# Patient Record
Sex: FEMALE | Race: WHITE | Employment: FULL TIME | URBAN - METROPOLITAN AREA
[De-identification: names, ages, dates, MRNs, and addresses within clinical notes are randomized per-mention and may not be internally consistent; named-entity substitution may affect disease eponyms.]

---

## 2020-11-11 ENCOUNTER — HOSPITAL ENCOUNTER (OUTPATIENT)
Dept: PHYSICAL THERAPY | Age: 55
Discharge: HOME | End: 2020-11-11
Payer: COMMERCIAL

## 2020-11-11 PROCEDURE — 97162 PT EVAL MOD COMPLEX 30 MIN: CPT

## 2020-11-11 PROCEDURE — 97110 THERAPEUTIC EXERCISES: CPT

## 2020-11-11 PROCEDURE — 97140 MANUAL THERAPY 1/> REGIONS: CPT

## 2020-11-11 NOTE — PROGRESS NOTES
PHYSICAL THERAPY - DAILY TREATMENT NOTE    Patient Name: Lupe Lott        Date: 2020  : 1965   YES Patient  Verified  Visit #:     Insurance: Payor: Rhina Hernandes / Plan: [employer] [place] HMO / Product Type: HMO /      In time: 10:25 AM Out time: 11:25 AM   Total Treatment Time: 60     Medicare/BCBS Time Tracking (below)   Total Timed Codes (min):  na 1:1 Treatment Time:  na     TREATMENT AREA =  Cervical radiculopathy (M54.12)    SUBJECTIVE    Pain Level (on 0 to 10 scale):  2  / 10   Medication Changes/New allergies or changes in medical history, any new surgeries or procedures? NO    If yes, update Summary List   Subjective Functional Status/Changes:  []  No changes reported     See POC          OBJECTIVE    10 min Therapeutic Exercise:  [x]  See flow sheet   Rationale:      increase ROM and increase strength to improve the patients ability to perform cervical ROM with decreased pain and radicular symptoms    15 min Manual Therapy: STM cervical paraspinal mm, parascapular mm, L deep cervical flexors, L SCM; light scar massage; SOR with cervical traction    Rationale:      decrease pain, increase ROM, increase tissue extensibility and decrease trigger points to improve patient's ability to perform cervical ROM and overhead reaching with decreased pain  The manual therapy interventions were performed at a separate and distinct time from the therapeutic activities interventions.     NB min Self Care: Reviewed diagnosis, prognosis, therapy progression   Rationale:    Improve understanding of injury and therapy to have realistic expectation of therapy to improve compliance/adherence and satisfaction    Billed With/As:   [x] TE   [] TA   [] Neuro   [] Self Care Patient Education: [x] Review HEP    [] Progressed/Changed HEP based on:   [] positioning   [] body mechanics   [] transfers   [] heat/ice application    [x] other: Instructed patient in light scar massage       Other Objective/Functional Measures:    Shown and performed HEP, patient reporting radicular symptoms with full chin tuck in supine position. Therapist instructing patient to perform chin tuck in smaller range, patient reporting relief of symptoms. She did c/o of L hip/LE symptoms/tingling while performing chin tucks, therapist feeling for compensation or arching through low back and unable to determine cause of symptoms into L LE during chin tuck. Demonstrates significant tightness through parascapular and paraspinal mm, demonstrates elevated R SC joint. Post Treatment Pain Level (on 0 to 10) scale:   1.5 / 10     ASSESSMENT  Assessment/Changes in Function:     See POC     []  See Progress Note/Recertification   Patient will continue to benefit from skilled PT services to modify and progress therapeutic interventions, address functional mobility deficits, address ROM deficits, address strength deficits, analyze and address soft tissue restrictions, analyze and cue movement patterns and analyze and modify body mechanics/ergonomics to attain goals per POC.    Progress toward goals / Updated goals:    See POC     PLAN  [x]  Upgrade activities as tolerated YES Continue plan of care   []  Discharge due to :    [x]  Other: 2x week for 6 weeks      Therapist: Monica Leo PT, DPT    Date: 11/11/2020 Time: 10:24 AM

## 2020-11-11 NOTE — PROGRESS NOTES
[address], [facility], [address] [facility] - Phone: [phone]  Fax: [phone] / [phone]  Patient Name: Yohan Hassan : 1965   Medical   Diagnosis: Status post cervical spinal fusion (Z98.1) Treatment Diagnosis: Cervical radiculopathy (M54.12)   Onset Date: About 1 year prior; surgery 20     Referral Source: Edna Mora MD [facility]): 2020   Prior Hospitalization: See medical history Provider #: [id number]   Prior Level of Function: Able to work 5 hours without difficulty; Able to lift > 25lbs without difficulty or pain    Comorbidities: None   Medications: Verified on Patient Summary List   The Plan of Care and following information is based on the information from the initial evaluation.   ===========================================================================================  Assessment / key information:  Yohan Hassan is a RHD 48 y.o.  yo female who has been referred to OPPT s/p cervical spinal fusion 20. Patient presents with neck pain & stiffness with tingling and numbness into L hand that is consistent with cervical radiculopathy. Patient reports long hx of neck pain and radicular symptoms that became more constant about 1 year ago. Had MRI completed early this year, tried PT and injections but found no change in symptoms. Surgical procedure completed 20, patient reports slight improvement in symptoms but the progress has been slow and now has a \"twitch\" in her left thumb. Still having burning/numbness into 4th & 5th fingers. Of note she also reports pain in mid-spine and has noticed tingling sensation down LLE that coincides with symptoms in LUE frequently. Has follow-up visit with surgeon next week, he might order MRI of lower spine if symptoms continue.      Pt rates pain as 4/10 at worst, 2/10 at best, and describes pain as burning/numbness into hand that is aggravating and tightness in neck. Symptoms increase with activity, overhead reaching, and holding neck in certain positions and decrease with laying in supine position with good support. Patient also reports new difficulty swallowing different foods (breads) since surgery but that it isn't too bothersome. 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 She was working 4-5 hr/day but has not returned to work since surgery. Objective:   Current Deficits: Pain, decreased UE strength, decreased cervical and thoracic ROM, impaired sensation LUE, tenderness with palpation with tightness through parascapular & paraspinal mm with decreased joint mobility  Posture: Slightly forward flexed posture  Visual Inspection: Fully healed incision on L anterior mid-cervical spine, elevated R SC joint  Palpation: Tenderness with palpation and trigger points through bilat parascapular mm, cervical and thoracic paraspinals; Increased tightness and tenderness through L deep cervical flexors, L SCM, and along healed scar tissue   Sensation: Reduced sensation in L lateral forearm, L 5th digit, and a \"tickling\" sensation through L 3rd digit  Cervical ROM: flex 26, ext 34, LSB 20 with \"tightness\", RSB 28, L rot 45, R rot 42; thoracic ROM L rotation 50% with tightness, R 75%   Strength:    Left UE: UT 5/5, Deltoid 5/5, Shld IR 5/5, Shld ER 5/5, Biceps 5/5, Triceps 5/5, Wrist flex 4-/5, Wrist Ext 4/5, Ulnar Dev 4/5, Intrinsics 4/5  Right UE: UT 5/5, Deltoid 5/5, Shld IR 5/5, Shld ER 5/5, Biceps 5/5, Triceps 5/5, Wrist flex 5/5, Wrist Ext 5/5, Ulnar Dev 5/5, Intrinsics 5/5  Special Tests: Cervical compression -, cervical traction -; attempted ULTT but patient reports radicular symptoms with initial shoulder depression with c-spine in neutral position; deep cervical flexion x6\" with R list  FOTO score = 58 /100 (an established functional score where 100 = no disability). Patient educated on diagnosis, prognosis, POC and HEP. Patient issued copy of HEP and denied additional questions. Patient will benefit from skilled PT services with a comprehensive POC/HEP to address impairments and restore function in order to return to prior level of function and prevent secondary impairments.  ===========================================================================================  Eval Complexity: History MEDIUM  Complexity : 1-2 comorbidities / personal factors will impact the outcome/ POC ;  Examination  MEDIUM Complexity : 3 Standardized tests and measures addressing body structure, function, activity limitation and / or participation in recreation ; Presentation MEDIUM Complexity : Evolving with changing characteristics ; Decision Making MEDIUM Complexity : FOTO score of 26-74; Overall Complexity MEDIUM  Problem List: pain affecting function, decrease ROM, decrease strength, decrease ADL/ functional abilitiies, decrease activity tolerance and decrease flexibility/ joint mobility FOTO = 58  Treatment Plan may include any combination of the following: Therapeutic exercise, Therapeutic activities, Neuromuscular re-education, Physical agent/modality, Manual therapy and Patient education  Patient / Family readiness to learn indicated by: asking questions, trying to perform skills and interest  Persons(s) to be included in education: patient (P)  Barriers to Learning/Limitations: no  Measures taken, if barriers to learning: N/A   Patient Goal (s): \"Decrease burning sensation\"   Patient self reported health status: Excellent  Rehabilitation Potential: good   Short Term Goals: To be accomplished in  2  weeks:  1. Patient will demonstrate independence with HEP for self management of symptoms  2. Decrease max pain 25-50% to assist with overhead reaching with decreased symptoms    Long Term Goals: To be accomplished in  6  weeks:  1.   Decrease max pain 50-75% to assist with return to full activity   2. Improve FOTO Functional Status Score by 7 points in order to show significant functional improvement. 3.  Will rate a +5 on Global Rating of Change and be prepared to DC to HEP. Frequency / Duration:   Patient to be seen  2  times per week for 6  weeks:  Patient / Caregiver education and instruction: self care and exercises  Therapist Signature: Ana Gomez PT, DPT Date: 47/58/5293   Certification Period: na Time: 10:24 AM   ===========================================================================================  I certify that the above Physical Therapy Services are being furnished while the patient is under my care. I agree with the treatment plan and certify that this therapy is necessary. Physician Signature:        Date:       Time:     Please sign and return to [facility] or you may fax the signed copy to [phone]. Thank you.

## 2020-11-13 ENCOUNTER — HOSPITAL ENCOUNTER (OUTPATIENT)
Dept: PHYSICAL THERAPY | Age: 55
Discharge: HOME | End: 2020-11-13
Payer: COMMERCIAL

## 2020-11-13 PROCEDURE — 97140 MANUAL THERAPY 1/> REGIONS: CPT

## 2020-11-13 PROCEDURE — 97014 ELECTRIC STIMULATION THERAPY: CPT

## 2020-11-13 PROCEDURE — 97110 THERAPEUTIC EXERCISES: CPT

## 2020-11-13 NOTE — PROGRESS NOTES
PHYSICAL THERAPY - DAILY TREATMENT NOTE    Patient Name: Anamaria Schmid        Date: 2020  : 1965   yes Patient  Verified  Visit #:      12  Insurance: Payor: Flaca Garza / Plan: [address] [place] [place] HMO / Product Type: HMO /      In time: 11:17 am Out time: 12:15 PM   Total Treatment Time: 62     Medicare/BCBS Time Tracking (below)   Total Timed Codes (min):  na 1:1 Treatment Time:  na     TREATMENT AREA =  Neck pain [M54.2]    SUBJECTIVE  Pain Level (on 0 to 10 scale):  1  / 10   Medication Changes/New allergies or changes in medical history, any new surgeries or procedures?    no  If yes, update Summary List   Subjective Functional Status/Changes:  []  No changes reported     Patient reports she is feeling really good actually, not really having much tingling into arm/hand either. 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 But overall feeling pretty good.           OBJECTIVE  Modalities Rationale:     decrease inflammation, decrease pain and increase tissue extensibility to improve patient's ability to perform prolong sitting activity with use of UE   15 min [x] Estim, type/location: IFC to cervical spine and upper back in supine with knee bolster                                      []  att     []  unatt     []  w/US     []  w/ice    [x]  w/heat    min []  Mechanical Traction: type/lbs                   []  pro   []  sup   []  int   []  cont    []  before manual    []  after manual    min []  Ultrasound, settings/location:      min []  Iontophoresis w/ dexamethasone, location:                                               []  take home patch       []  in clinic    min []  Ice     []  Heat    location/position:     min []  Vasopneumatic Device, press/temp:     min []  Other:    [x] Skin assessment post-treatment (if applicable):    [x]  intact    [x]  redness- no adverse reaction     []redness - adverse reaction:        30 min Therapeutic Exercise:  [x]  See flow sheet   Rationale:      increase ROM and increase strength to improve the patients ability to perform overhead reaching and prolonged UE use with decreased pain      17 min Manual Therapy: STM cervical paraspinal mm, parascapular mm, bilat UT/LS, L deep cervical flexors, L SCM; light scar massage; SOR with cervical traction    Rationale:      decrease pain, increase ROM, increase tissue extensibility and decrease trigger points to improve patient's ability to cervical ROM. The manual therapy interventions were performed at a separate and distinct time from the therapeutic activities interventions. Billed With/As:   [x] TE   [] TA   [] Neuro   [] Self Care Patient Education: [x] Review HEP    [] Progressed/Changed HEP based on:   [x] positioning   [x] body mechanics   [] transfers   [] heat/ice application    [] other:      Other Objective/Functional Measures: Added exercises per flow sheet to focus on gentle cervical ROM & cervical and parascapular strengthening. Patient requiring cuing for proper scapular retraction during t-band activity. Demonstrates significant tightness through cervical paraspinals, L sided deep cervical flexors, parascapular mm, and L>R SCM. Post Treatment Pain Level (on 0 to 10) scale:   0  / 10     ASSESSMENT  Assessment/Changes in Function:     Patient with decreased radicular symptoms during session today. Tolerated all exercises without increasing pain or radicular symptoms. No pain or radicular symptoms at end of session. []  See Progress Note/Recertification   Patient will continue to benefit from skilled PT services to modify and progress therapeutic interventions, address functional mobility deficits, address ROM deficits, address strength deficits, analyze and address soft tissue restrictions and analyze and cue movement patterns to attain remaining goals. Progress toward goals / Updated goals: · Short Term Goals:  To be accomplished in  2  weeks:  1. Patient will demonstrate independence with HEP for self management of symptoms - Progressing 11/13  2. Decrease max pain 25-50% to assist with overhead reaching with decreased symptoms - Progressing 11/13, 0-1/10   · Long Term Goals: To be accomplished in  6  weeks:  1. Decrease max pain 50-75% to assist with return to full activity   2. Improve FOTO Functional Status Score by 7 points in order to show significant functional improvement.   3.  Will rate a +5 on Global Rating of Change and be prepared to DC to HEP     PLAN  [x]  Upgrade activities as tolerated yes Continue plan of care   []  Discharge due to :    []  Other:      Therapist: Dominique Gómez, PT    Date: 11/13/2020 Time: 11:21 AM     Future Appointments   Date Time Provider Melo De La Vega   11/17/2020 10:00 AM Abelardodinorah Roque [address] [facility]   11/25/2020  4:15 PM Jayson Wooten April,  [address] [facility]   12/1/2020 10:45 AM Diogenes Oz,  [address] [facility]   12/3/2020 10:45 AM Diogenes Oz,  [address] [facility]   12/8/2020  9:45 AM Diogenes Oz,  [address] [facility]   12/10/2020  9:15 AM Diogenes Oz,  [address] [facility]   12/15/2020  9:15 AM Diogenes Oz,  [address] [facility]   12/17/2020  9:15 AM Diogenes Oz,  [address] [facility]   12/22/2020 11:45 AM Jayson Wooten, April, [place] [address]   12/28/2020  9:30 AM Jayson Wooten April, PT [address]

## 2020-11-17 ENCOUNTER — HOSPITAL ENCOUNTER (OUTPATIENT)
Dept: PHYSICAL THERAPY | Age: 55
Discharge: HOME | End: 2020-11-17
Payer: COMMERCIAL

## 2020-11-17 PROCEDURE — 97140 MANUAL THERAPY 1/> REGIONS: CPT

## 2020-11-17 PROCEDURE — 97110 THERAPEUTIC EXERCISES: CPT

## 2020-11-17 PROCEDURE — 97014 ELECTRIC STIMULATION THERAPY: CPT

## 2020-11-17 NOTE — PROGRESS NOTES
PHYSICAL THERAPY - DAILY TREATMENT NOTE    Patient Name: Lizeth Hawkins        Date: 2020  : 1965   yes Patient  Verified  Visit #:   3   of   12  Insurance: Payor: Marlon Wright / Plan: [employer] HMO / Product Type: HMO /      In time: 5856 Out time: 1100   Total Treatment Time: 55     Medicare/BCBS Time Tracking (below)   Total Timed Codes (min):  na 1:1 Treatment Time:  na     TREATMENT AREA =  Neck pain [M54.2]    SUBJECTIVE  Pain Level (on 0 to 10 scale):  1  / 10   Medication Changes/New allergies or changes in medical history, any new surgeries or procedures?    no  If yes, update Summary List   Subjective Functional Status/Changes:  []  No changes reported     I still have that pain in my arm but it's been much better          OBJECTIVE  Modalities Rationale:     decrease inflammation and decrease pain to improve patient's ability to perform general ADLs with decrease c/o symptoms. 10 min [x] Estim, type/location: IFC to cervical spine and upper back in supine with knee bolster                                           []  att     [x]  unatt     []  w/US     []  w/ice    [x]  w/heat    min []  Mechanical Traction: type/lbs                   []  pro   []  sup   []  int   []  cont    []  before manual    []  after manual    min []  Ultrasound, settings/location:      min []  Iontophoresis w/ dexamethasone, location:                                               []  take home patch       []  in clinic    min []  Ice     []  Heat    location/position:     min []  Vasopneumatic Device, press/temp:     min []  Other:    [x] Skin assessment post-treatment (if applicable):    [x]  intact    []  redness- no adverse reaction     []redness - adverse reaction:        25 min Therapeutic Exercise:  [x]  See flow sheet   Rationale:      increase ROM and increase strength to improve the patients ability to perform general ADLs with decrease c/o symptoms.      20 min Manual Therapy: STM cervical paraspinal mm, parascapular mm, bilat UT/LS, L deep cervical flexors, L SCM; light scar massage; SOR with cervical traction    Rationale:      decrease pain, increase ROM and increase tissue extensibility to improve patient's ability to perform general ADLs with decrease c/o symptoms. The manual therapy interventions were performed at a separate and distinct time from the therapeutic activities interventions. Billed With/As:   [x] TE   [] TA   [] Neuro   [] Self Care Patient Education: [x] Review HEP    [] Progressed/Changed HEP based on:   [] positioning   [] body mechanics   [] transfers   [] heat/ice application    [] other:      Other Objective/Functional Measures:    Good (B) rotation motion. No significant muscular restrictions noted throughout anterior C/S. Post Treatment Pain Level (on 0 to 10) scale:   0  / 10     ASSESSMENT  Assessment/Changes in Function:     No change in therx routine today. Able to perform all therx with good tolerance. Patient reporting continued difficulty with swallowing and presence of (L) radicular symptoms. []  See Progress Note/Recertification   Patient will continue to benefit from skilled PT services to modify and progress therapeutic interventions, address ROM deficits, address strength deficits, analyze and address soft tissue restrictions and analyze and cue movement patterns to attain remaining goals. Progress toward goals / Updated goals: · Short Term Goals: To be accomplished in  2  weeks:  1. Patient will demonstrate independence with HEP for self management of symptoms - Progressing 11/13  2. Decrease max pain 25-50% to assist with overhead reaching with decreased symptoms - Progressing 11/13, 0-1/10   · Long Term Goals: To be accomplished in  6  weeks:  1.  Decrease max pain 50-75% to assist with return to full activity   2.  Improve FOTO Functional Status Score by 7 points in order to show significant functional improvement.   3.  Will rate a +5 on Global Rating of Change and be prepared to DC to HEP       PLAN  []  Upgrade activities as tolerated yes Continue plan of care   []  Discharge due to :    []  Other:      Therapist: Mckayla Sheldon PTA    Date: 11/17/2020 Time: 7:06 AM     Future Appointments   Date Time Provider Melo De La Vega   11/17/2020 10:00 AM Eda Aviles [address] [facility]   11/25/2020  4:15 PM Tal York, April,  [address] [facility]   12/1/2020 10:45 AM Kathlynn Mura, PTA [address]   12/3/2020 10:45 AM Kathlynn Mura, PTA [address]   12/8/2020  9:45 AM Kathlynn Mura, PTA [address]   12/10/2020  9:15 AM Kathlynn Mura, PTA [address]   12/15/2020  9:15 AM Kathlynn Mura, PTA [address]   12/17/2020  9:15 AM Kathlynn Mura,  [address] [facility]   12/22/2020 11:45 AM Tal York, April, [place] [address] [facility]   12/28/2020  9:30 AM Tal York, April, PT [address]

## 2020-11-25 ENCOUNTER — HOSPITAL ENCOUNTER (OUTPATIENT)
Dept: PHYSICAL THERAPY | Age: 55
Discharge: HOME | End: 2020-11-25
Payer: COMMERCIAL

## 2020-11-25 PROCEDURE — 97140 MANUAL THERAPY 1/> REGIONS: CPT

## 2020-11-25 PROCEDURE — 97014 ELECTRIC STIMULATION THERAPY: CPT

## 2020-11-25 PROCEDURE — 97110 THERAPEUTIC EXERCISES: CPT

## 2020-11-25 NOTE — PROGRESS NOTES
PHYSICAL THERAPY - DAILY TREATMENT NOTE    Patient Name: Jamar Duarte        Date: 2020  : 1965   yes Patient  Verified  Visit #:     Insurance: Payor: Charles Jorgensen / Plan: [address] [place] [employer] / Product Type: HMO /      In time: 4:22 pm Out time: 5:12 pm   Total Treatment Time: 50     Medicare/BCBS Time Tracking (below)   Total Timed Codes (min):  na 1:1 Treatment Time:  na     TREATMENT AREA =  Neck pain [M54.2]    SUBJECTIVE  Pain Level (on 0 to 10 scale):  2  / 10   Medication Changes/New allergies or changes in medical history, any new surgeries or procedures?    no  If yes, update Summary List   Subjective Functional Status/Changes:  []  No changes reported     Patient reports she has been doing pretty good overall, just having some discomfort/tightness in L upper trap. Still have trouble with swolling depending on the consistency of the food.         OBJECTIVE  Modalities Rationale:     decrease pain and increase tissue extensibility to improve patient's ability to tolerate prolong sitting and overhead reaching with decreased pain   10 min [] Estim, type/location: IFC to cervical spine and upper back in supine with knee bolster                                      []  att     [x]  unatt     []  w/US     []  w/ice    [x]  w/heat    min []  Mechanical Traction: type/lbs                   []  pro   []  sup   []  int   []  cont    []  before manual    []  after manual    min []  Ultrasound, settings/location:      min []  Iontophoresis w/ dexamethasone, location:                                               []  take home patch       []  in clinic    min []  Ice     []  Heat    location/position:     min []  Vasopneumatic Device, press/temp:     min []  Other:    [x] Skin assessment post-treatment (if applicable):    [x]  intact    [x]  redness- no adverse reaction     []redness - adverse reaction:        25 min Therapeutic Exercise:  [x]  See flow sheet   Rationale:      increase ROM, increase strength and improve coordination to improve the patients ability to perform ADLs and overhead reaching with decreased pain      15 min Manual Therapy: STM cervical paraspinal mm, parascapular mm, bilat UT/LS, L deep cervical flexors, L SCM; light scar massage; SOR with cervical traction    Rationale:      decrease pain, increase ROM, increase tissue extensibility and decrease trigger points to improve patient's ability to perform ADLs with decreased overall pain  The manual therapy interventions were performed at a separate and distinct time from the therapeutic activities interventions. Billed With/As:   [x] TE   [] TA   [] Neuro   [] Self Care Patient Education: [x] Review HEP    [] Progressed/Changed HEP based on:   [] positioning   [] body mechanics   [] transfers   [] heat/ice application    [] other:      Other Objective/Functional Measures: Added FR Y on wall for t-spine ext. Patient requiring cuing during t-band exercises for proper positioning to elicit correct mm contraction. Significant trigger point B upper trap during manual therapy. Post Treatment Pain Level (on 0 to 10) scale:   0  / 10     ASSESSMENT  Assessment/Changes in Function:     Patient with good tolerance of exercises. Complete resolution of symptoms following manual and IFC/MHP. []  See Progress Note/Recertification   Patient will continue to benefit from skilled PT services to modify and progress therapeutic interventions, address ROM deficits, address strength deficits, analyze and address soft tissue restrictions, analyze and cue movement patterns and analyze and modify body mechanics/ergonomics to attain remaining goals. Progress toward goals / Updated goals: · Short Term Goals: To be accomplished in  2  weeks:  1. Patient will demonstrate independence with HEP for self management of symptoms - MET  2.  Decrease max pain 25-50% to assist with overhead reaching with decreased symptoms - Progressing 11/25, 0-2/10   · Long Term Goals: To be accomplished in  6  weeks:  1.  Decrease max pain 50-75% to assist with return to full activity   2.  Improve FOTO Functional Status Score by 7 points in order to show significant functional improvement.   3.  Will rate a +5 on Global Rating of Change and be prepared to DC to [facility]     PLAN  [x]  Upgrade activities as tolerated yes Continue plan of care   []  Discharge due to :    []  Other:      Therapist: Monica Reynolds PT, DPT    Date: 11/25/2020 Time: 4:35 PM     Future Appointments   Date Time Provider Melo De La Vega   12/1/2020 10:45 AM Lucius Alison, PTA [address] [address]   12/3/2020 10:45 AM Lucius Alison, PTA [facility] [address]   12/8/2020  9:45 AM Lucius Alison, PTA [address] [address]   12/10/2020  9:15 AM Lucius Alison, PTA [address] [address]   12/15/2020  9:15 AM Lucius Alison, PTA [address]   12/17/2020  9:15 AM Lucius Alison, PTA [address] [address]   12/22/2020 11:45 AM Monica Thornton [facility]   12/28/2020  9:30 AM Monica Thornton, PT [facility] [address]

## 2020-12-01 ENCOUNTER — HOSPITAL ENCOUNTER (OUTPATIENT)
Dept: PHYSICAL THERAPY | Age: 55
Discharge: HOME | End: 2020-12-01
Payer: COMMERCIAL

## 2020-12-01 PROCEDURE — 97140 MANUAL THERAPY 1/> REGIONS: CPT

## 2020-12-01 PROCEDURE — 97014 ELECTRIC STIMULATION THERAPY: CPT

## 2020-12-01 PROCEDURE — 97110 THERAPEUTIC EXERCISES: CPT

## 2020-12-01 NOTE — PROGRESS NOTES
PHYSICAL THERAPY - DAILY TREATMENT NOTE    Patient Name: Norberto Alert        Date: 2020  : 1965   yes Patient  Verified  Visit #:     Insurance: Payor: Tedtejal Tabitha / Plan: [address] [place] [address] [place] HMO / Product Type: HMO /      In time: 8875 Out time: 1150   Total Treatment Time: 60     Medicare/[facility] Time Tracking (below)   Total Timed Codes (min):  na 1:1 Treatment Time:  na     TREATMENT AREA =  Neck pain [M54.2]    SUBJECTIVE  Pain Level (on 0 to 10 scale):  0  / 10   Medication Changes/New allergies or changes in medical history, any new surgeries or procedures?    no  If yes, update Summary List   Subjective Functional Status/Changes:  []  No changes reported     I only feel a little pain when I turn my head to the lef.t          OBJECTIVE  Modalities Rationale:     decrease pain and increase tissue extensibility to improve patient's ability to perform general ADLs with decrease c/o symptoms. 10 min [x] Estim, type/location: IFC to cervical spine and upper back in supine with knee bolster                                      []  att     [x]  unatt     []  w/US     []  w/ice    [x]  w/heat    min []  Mechanical Traction: type/lbs                   []  pro   []  sup   []  int   []  cont    []  before manual    []  after manual    min []  Ultrasound, settings/location:      min []  Iontophoresis w/ dexamethasone, location:                                               []  take home patch       []  in clinic    min []  Ice     []  Heat    location/position:     min []  Vasopneumatic Device, press/temp:     min []  Other:    [x] Skin assessment post-treatment (if applicable):    [x]  intact    []  redness- no adverse reaction     []redness - adverse reaction:        30 min Therapeutic Exercise:  [x]  See flow sheet   Rationale:      increase ROM and increase strength to improve the patients ability to perform general ADLs with decrease c/o symptoms.       20 min Manual Therapy: STM cervical paraspinal mm, parascapular mm, bilat UT/LS, L deep cervical flexors, L SCM; light scar massage; SOR with cervical traction    Rationale:      decrease pain, increase ROM and increase tissue extensibility to improve patient's ability to perform general ADLs with decrease c/o symptoms. The manual therapy interventions were performed at a separate and distinct time from the therapeutic activities interventions. Billed With/As:   [x] TE   [] TA   [] Neuro   [] Self Care Patient Education: [x] Review HEP    [] Progressed/Changed HEP based on:   [] positioning   [] body mechanics   [] transfers   [] heat/ice application    [] other:      Other Objective/Functional Measures:    Increase (L) UT tension with C/S (L) rotation. TTP at (R) UT,     Post Treatment Pain Level (on 0 to 10) scale:   0  / 10     ASSESSMENT  Assessment/Changes in Function:     No pain in (L) C/S with (L) rotation after manual and therx. []  See Progress Note/Recertification   Patient will continue to benefit from skilled PT services to modify and progress therapeutic interventions, address ROM deficits, address strength deficits, analyze and address soft tissue restrictions and analyze and cue movement patterns to attain remaining goals. Progress toward goals / Updated goals: · Short Term Goals: To be accomplished in  2  weeks:  1. Patient will demonstrate independence with HEP for self management of symptoms - MET  2. Decrease max pain 25-50% to assist with overhead reaching with decreased symptoms - Progressing 11/25, 0-2/10   · Long Term Goals: To be accomplished in  6  weeks:  1.  Decrease max pain 50-75% to assist with return to full activity   2.  Improve FOTO Functional Status Score by 7 points in order to show significant functional improvement.   3.  Will rate a +5 on Global Rating of Change and be prepared to DC to [facility]     PLAN  []  Upgrade activities as tolerated yes Continue plan of care   []  Discharge due to :    []  Other: Therapist: Ja Narayanan PTA    Date: 12/1/2020 Time: 7:17 AM     Future Appointments   Date Time Provider Melo De La Vega   12/1/2020 10:45 AM Zcak Boateng [facility]   12/3/2020 10:45 AM Vernelle Ahr, PTA [facility]   12/8/2020  9:45 AM Vernelle Ahr, PTA [facility]   12/10/2020  9:15 AM Vernelle Ahr, [facility] [address]   12/15/2020  9:15 AM Vernelle Ahr, [facility]   12/17/2020  9:15 AM Zack Boateng [facility]   12/22/2020 11:45 AM Monica [place], [place] [address]   12/28/2020  9:30 AM Mandeep Gorman [place],  [address]

## 2020-12-03 ENCOUNTER — HOSPITAL ENCOUNTER (OUTPATIENT)
Dept: PHYSICAL THERAPY | Age: 55
Discharge: HOME | End: 2020-12-03
Payer: COMMERCIAL

## 2020-12-03 PROCEDURE — 97140 MANUAL THERAPY 1/> REGIONS: CPT

## 2020-12-03 PROCEDURE — 97110 THERAPEUTIC EXERCISES: CPT

## 2020-12-03 PROCEDURE — 97014 ELECTRIC STIMULATION THERAPY: CPT

## 2020-12-03 NOTE — PROGRESS NOTES
PHYSICAL THERAPY - DAILY TREATMENT NOTE    Patient Name: Rock Harvey        Date: 12/3/2020  : 1965   yes Patient  Verified  Visit #:     Insurance: Payor: Berryjairon Gallegos / Plan: [address] [place] [employer] / Product Type: HMO /      In time: 2860 Out time: 7943   Total Treatment Time: 60     Medicare/BCBS Time Tracking (below)   Total Timed Codes (min):  na 1:1 Treatment Time:  na     TREATMENT AREA =  Neck pain [M54.2]    SUBJECTIVE  Pain Level (on 0 to 10 scale):  0  / 10   Medication Changes/New allergies or changes in medical history, any new surgeries or procedures?    no  If yes, update Summary List   Subjective Functional Status/Changes:  []  No changes reported     Reports no pain today with AROM of cervical spine rotation bilateral. Denies radicular symptoms.           OBJECTIVE  Modalities Rationale:     decrease pain and increase tissue extensibility to improve patient's ability to perform general ADLs with decrease c/o symptoms.    10 min [x] Estim, type/location: IFC to cervical spine and upper back in supine with knee bolster                                      []  att     [x]  unatt     []  w/US     []  w/ice    [x]  w/heat    min []  Mechanical Traction: type/lbs                   []  pro   []  sup   []  int   []  cont    []  before manual    []  after manual    min []  Ultrasound, settings/location:      min []  Iontophoresis w/ dexamethasone, location:                                               []  take home patch       []  in clinic    min []  Ice     []  Heat    location/position:     min []  Vasopneumatic Device, press/temp:     min []  Other:    [x] Skin assessment post-treatment (if applicable):    [x]  intact    []  redness- no adverse reaction     []redness - adverse reaction:        35 min Therapeutic Exercise:  [x]  See flow sheet   Rationale:      increase ROM, increase strength and improve coordination to improve the patients ability to perform general ADLs with decrease c/o symptoms.        15 min Manual Therapy: STM cervical paraspinal mm, parascapular mm, bilat UT/LS, L deep cervical flexors, L SCM; light scar massage; SOR with cervical traction    Rationale:      decrease pain, increase ROM and increase tissue extensibility to improve patient's ability to perform general ADLs with decrease c/o symptoms.    The manual therapy interventions were performed at a separate and distinct time from the therapeutic activities interventions. Billed With/As:   [x] TE   [] TA   [] Neuro   [] Self Care Patient Education: [x] Review HEP    [] Progressed/Changed HEP based on:   [] positioning   [] body mechanics   [] transfers   [] heat/ice application    [] other:      Other Objective/Functional Measures: Add C/S rotation with OTB 10x, C/S retraction 10 x 5 seconds, Prone: Ts, Ys, scap retraction. Post Treatment Pain Level (on 0 to 10) scale:   0  / 10     ASSESSMENT  Assessment/Changes in Function:     Good tolerance to new therx without increase of pain. []  See Progress Note/Recertification   Patient will continue to benefit from skilled PT services to modify and progress therapeutic interventions, address ROM deficits, address strength deficits, analyze and address soft tissue restrictions and analyze and cue movement patterns to attain remaining goals. Progress toward goals / Updated goals: · Short Term Goals: To be accomplished in  2  weeks:  1. Patient will demonstrate independence with HEP for self management of symptoms - MET  2. Decrease max pain 25-50% to assist with overhead reaching with decreased symptoms - Achieved 12/3/2020   · Long Term Goals: To be accomplished in  6  weeks:  1.  Decrease max pain 50-75% to assist with return to full activity   2.  Improve FOTO Functional Status Score by 7 points in order to show significant functional improvement.   3.  Will rate a +5 on Global Rating of Change and be prepared to DC to HEP       PLAN  []  Upgrade activities as tolerated yes Continue plan of care   []  Discharge due to :    []  Other:      Therapist: Yecenia Parkinson PTA    Date: 12/3/2020 Time: 7:12 AM     Future Appointments   Date Time Provider Melo De La Vega   12/3/2020 10:45 AM Sakina Quezada, YUDITH [address] [address]   12/8/2020  9:45 AM Sakina Quezada, YUD[facility] [address]   12/10/2020  9:15 AM Sakina Quezada PTA [facility] [address]   12/15/2020  9:15 AM Sakina Quezada, YUD[facility] [address]   12/17/2020  9:15 AM Edger Gowers [address]   12/22/2020 11:45 AM Pamela Baez [place], [address] [facility]   12/28/2020  9:30 AM Pamela Baez [place], PT [address]

## 2020-12-08 ENCOUNTER — HOSPITAL ENCOUNTER (OUTPATIENT)
Dept: PHYSICAL THERAPY | Age: 55
Discharge: HOME | End: 2020-12-08
Payer: COMMERCIAL

## 2020-12-08 PROCEDURE — 97014 ELECTRIC STIMULATION THERAPY: CPT

## 2020-12-08 PROCEDURE — 97140 MANUAL THERAPY 1/> REGIONS: CPT

## 2020-12-08 PROCEDURE — 97110 THERAPEUTIC EXERCISES: CPT

## 2020-12-08 NOTE — PROGRESS NOTES
PHYSICAL THERAPY - DAILY TREATMENT NOTE    Patient Name: Spike Curtis        Date: 2020  : 1965   yes Patient  Verified  Visit #:     Insurance: Payor: Mai Dillon / Plan: [employer] HMO / Product Type: HMO /      In time: 950 Out time: 6873   Total Treatment Time: 55     Medicare/BCBS Time Tracking (below)   Total Timed Codes (min):  na 1:1 Treatment Time:  na     TREATMENT AREA =  Neck pain [M54.2]    SUBJECTIVE  Pain Level (on 0 to 10 scale):  1  / 10   Medication Changes/New allergies or changes in medical history, any new surgeries or procedures?    no  If yes, update Summary List   Subjective Functional Status/Changes:  []  No changes reported     Having a good day. No real pain. Positional pain on (L) Ut and arm.           OBJECTIVE  Modalities Rationale:     decrease inflammation and decrease pain to improve patient's ability to  perform general ADLs with decrease c/o symptoms.    10 min [x] Estim, type/location: IFC to cervical spine and upper back in supine with knee bolster                                       []  att     [x]  unatt     []  w/US     []  w/ice    [x]  w/heat    min []  Mechanical Traction: type/lbs                   []  pro   []  sup   []  int   []  cont    []  before manual    []  after manual    min []  Ultrasound, settings/location:      min []  Iontophoresis w/ dexamethasone, location:                                               []  take home patch       []  in clinic    min []  Ice     []  Heat    location/position:     min []  Vasopneumatic Device, press/temp:     min []  Other:    [x] Skin assessment post-treatment (if applicable):    [x]  intact    []  redness- no adverse reaction     []redness - adverse reaction:        30 min Therapeutic Exercise:  [x]  See flow sheet   Rationale:      increase ROM, increase strength, improve coordination and improve balance to improve the patients ability to perform general ADLs with decrease c/o symptoms.      15 min Manual Therapy: STM cervical paraspinal mm, parascapular mm, bilat UT/LS, L deep cervical flexors, L SCM; light scar massage; SOR with cervical traction    Rationale:      decrease pain, increase ROM and increase tissue extensibility to improve patient's ability to perform general ADLs with decrease c/o symptoms.    The manual therapy interventions were performed at a separate and distinct time from the therapeutic activities interventions. Billed With/As:   [x] TE   [] TA   [] Neuro   [] Self Care Patient Education: [x] Review HEP    [] Progressed/Changed HEP based on:   [] positioning   [] body mechanics   [] transfers   [] heat/ice application    [] other:      Other Objective/Functional Measures:    Overall good quality of muscle tissue with decreasing tension. Post Treatment Pain Level (on 0 to 10) scale:   0  / 10     ASSESSMENT  Assessment/Changes in Function:     Patient without increase of pain/symptoms after treatment,  Decrease sidebending AROM (B). Good rotation and flexion/extension. []  See Progress Note/Recertification   Patient will continue to benefit from skilled PT services to modify and progress therapeutic interventions, address functional mobility deficits, address ROM deficits, address strength deficits, analyze and address soft tissue restrictions and analyze and cue movement patterns to attain remaining goals. Progress toward goals / Updated goals: · Short Term Goals: To be accomplished in  2  weeks:  1. Patient will demonstrate independence with HEP for self management of symptoms - MET  2. Decrease max pain 25-50% to assist with overhead reaching with decreased symptoms - Achieved 12/3/2020   · Long Term Goals: To be accomplished in  6  weeks:  1.  Decrease max pain 50-75% to assist with return to full activity   2.  Improve FOTO Functional Status Score by 7 points in order to show significant functional improvement.   3.  Will rate a +5 on Global Rating of Change and be prepared to DC to HEP       PLAN  []  Upgrade activities as tolerated yes Continue plan of care   []  Discharge due to :    []  Other:      Therapist: Aby Malin PTA    Date: 12/8/2020 Time: 7:40 AM     Future Appointments   Date Time Provider Melo De La Vega   12/8/2020  9:45 AM Jose Gagnon PTA [facility] [address]   12/10/2020  9:15 AM Jose Gagnon PTA [facility] [address]   12/15/2020  9:15 AM Jose Gagnon PTA [address] [address]   12/17/2020  9:15 AM Tanesha Collado [address] [address]   12/22/2020 11:45 AM Cal Pinto, [place], [place] [address] [facility]   12/28/2020  9:30 AM Cal Pinto [place], PT [address]

## 2020-12-10 ENCOUNTER — APPOINTMENT (OUTPATIENT)
Dept: PHYSICAL THERAPY | Age: 55
End: 2020-12-10
Payer: COMMERCIAL

## 2020-12-15 ENCOUNTER — APPOINTMENT (OUTPATIENT)
Dept: PHYSICAL THERAPY | Age: 55
End: 2020-12-15
Payer: COMMERCIAL

## 2020-12-17 ENCOUNTER — APPOINTMENT (OUTPATIENT)
Dept: PHYSICAL THERAPY | Age: 55
End: 2020-12-17
Payer: COMMERCIAL

## 2020-12-22 ENCOUNTER — APPOINTMENT (OUTPATIENT)
Dept: PHYSICAL THERAPY | Age: 55
End: 2020-12-22
Payer: COMMERCIAL

## 2020-12-28 ENCOUNTER — APPOINTMENT (OUTPATIENT)
Dept: PHYSICAL THERAPY | Age: 55
End: 2020-12-28
Payer: COMMERCIAL

## 2021-09-07 NOTE — PROGRESS NOTES
[address] [facility], [facility] - Phone: [phone]  Fax: [phone] SUMMARY  Patient Name: Alejandra Hawk : 1965   Treatment/Medical Diagnosis: Neck pain [M54.2]     Referral Source: Hoa Pal MD     Date of Initial Visit: 2020 Attended Visits: 7 Missed Visits: 0     SUMMARY OF TREATMENT  Alejandra Hawk has been seen at our clinic for a total of 7 visits. Pt treatment has consisted of aerobic warm-up with UBE, therapeutic exercise for cervical ROM, postural correction, modalities prn and manual therapy (STM cervical paraspinal mm, parascapular mm, bilat UT/LS, L deep cervical flexors, L SCM; light scar massage)    CURRENT STATUS  Pt has had a good tolerance to physical therapy treatment. Unable to complete a formal reassessment toward goals as patient did not return to continue with further treatment. Patient was presenting to therapy without increase of pain/symptoms (0-2/10), decrease sidebending AROM (B) and good rotation and flexion/extension. Overall good quality of muscle tissue with decreasing tension. · Short Term Goals: To be accomplished in  2  weeks:  1. Patient will demonstrate independence with HEP for self management of symptoms - MET  2. Decrease max pain 25-50% to assist with overhead reaching with decreased symptoms - Achieved 12/3/2020       Goal/Measure of Progress Goal Met? 1. Decrease max pain 50-75% to assist with return to full activity    Status at last Eval: Pt rates pain as 4/10 at worst, 2/10 at best Current Status:  no   2. Improve FOTO Functional Status Score by 7 points in order to show significant functional improvement. Status at last Eval: 58 Current Status:  no   3.   Will rate a +5 on Global Rating of Change and be prepared to DC to HEP   Status at last Eval:  Current Status:  no       RECOMMENDATIONS  Discharge from physical therapy treatment. Specifics: Patient did not return for further treatment of diagnosis. If you have any questions/comments please contact us directly at [phone]. Thank you for allowing us to assist in the care of your patient.     LPTA Signature: Bob Ortiz PTA Date: 9/7/2021   PT Signature: Samra Fernandez, MARTA Time: 8:47 AM

## 2021-10-04 ENCOUNTER — HOSPITAL ENCOUNTER (OUTPATIENT)
Dept: PHYSICAL THERAPY | Age: 56
Discharge: HOME | End: 2021-10-04
Payer: COMMERCIAL

## 2021-10-04 PROCEDURE — 97112 NEUROMUSCULAR REEDUCATION: CPT

## 2021-10-04 PROCEDURE — 97161 PT EVAL LOW COMPLEX 20 MIN: CPT

## 2021-10-04 PROCEDURE — 97110 THERAPEUTIC EXERCISES: CPT

## 2021-10-04 NOTE — PROGRESS NOTES
PHYSICAL THERAPY - DAILY TREATMENT NOTE     Patient Name: Hang Ch        Date: 10/4/2021  : 1965   YES Patient  Verified  Visit #:     Insurance: Payor: Chelle Love / Plan: [place] [employer] / Product Type: PPO /      In time: 1:50 PM Out time: 2:50 pm   Total Treatment Time: 60 min     Medicare/[facility] Time Tracking (below)   Total Timed Codes (min):  NA 1:1 Treatment Time:  NA     TREATMENT AREA =  Low back pain [M54.50]    SUBJECTIVE    Pain Level (on 0 to 10 scale):  3  / 10   Medication Changes/New allergies or changes in medical history, any new surgeries or procedures? NO    If yes, update Summary List   Subjective Functional Status/Changes:  []  No changes reported     See POC         OBJECTIVE    12 min Therapeutic Activity: [x]  See flow sheet   Rationale:      increase ROM, increase strength, improve coordination and increase proprioception to improve the patients ability to perform work, ADL, household chore, community mobility, and recreation/fitness tasks. 12 min Neuromuscular Re-ed: [x]  See flow sheet   Rationale:      increase strength, improve coordination and increase proprioception to improve the patients ability to perform work, ADL, household chore, community mobility, and recreation/fitness tasks. Billed With/As:   [x] TE   [x] TA   [x] Neuro   [x] Self Care Patient Education: [x] Review HEP    [] Progressed/Changed HEP based on:   [] positioning   [] body mechanics   [] transfers   [] heat/ice application    [x] other:  Patient given handouts with pictures and written directions for beginning HEP; copies of handouts placed in patient's chart. Other Objective/Functional Measures:    See POC     Post Treatment Pain Level (on 0 to 10) scale:   ?  / 10--Not Reassessed.      ASSESSMENT    Assessment/Changes in Function:     See POC     []  See Progress Note/Recertification   Patient will continue to benefit from skilled PT services to modify and progress therapeutic interventions, address functional mobility deficits, address ROM deficits, address strength deficits, analyze and address soft tissue restrictions, analyze and cue movement patterns, analyze and modify body mechanics/ergonomics and assess and modify postural abnormalities to attain remaining goals.       Progress toward goals / Updated goals:    See POC       PLAN    [x]  Upgrade activities as tolerated YES Continue plan of care   []  Discharge due to :    []  Other:      Therapist: Darius Saleem PT    Date: 10/4/2021 Time: 3:11 PM     Future Appointments   Date Time Provider Melo De La Vega   10/6/2021  4:15 PM Reed Vivar, PT [facility]   10/11/2021  3:00 PM Celestina Robin, PT [facility]   10/14/2021  8:15 AM Reed Vivar, PT [facility]   10/20/2021  2:15 PM Celestina Robin, PT [facility]   10/25/2021  2:15 PM Celestina Robin, PT [facility]   10/27/2021  2:15 PM Celestina Robin, PT [facility]

## 2021-10-04 NOTE — PROGRESS NOTES
[facility] [facility] PHYSICAL THERAPY AT [facility] [address], [address] - Phone: [phone]  Fax: [phone] / [phone]  Patient Name: Wendy Butts : 1965   Medical   Diagnosis: Low back pain [M54.50] Treatment Diagnosis: LBP, left LE radicular pain   Onset Date: \"1.5 years\"     Referral Source: Buddy Awad MD Start of Care [facility]): 10/4/2021   Prior Hospitalization: See medical history Provider #: [id number]   Prior Level of Function: Active with work, walking, pet care, community mobility   Comorbidities: Cervical Fusion (C2-3 and C5-6) on 20; Thyroid; Weight Change; Insomnia   Medications: Verified on Patient Summary List     The Plan of Care and following information is based on the information from the initial evaluation.   ===========================================================================================  Assessment / mullen information:   Wendy Butts is a 55 y.o. Right handed female with Dx of Low back pain [M54.50]. She currently rates her pain as 5/10 at worst, 2/10 at best, primarily located at left lower back, buttock and down posterior/lateral thigh and leg to foot--\"buring\". She complains of difficulty and increase pain with sittiing; better with lying down. Today in PT, patient relates having had PT for LBP in the past, but no LE pain then. Pt reports having had lumbar MRI in  or 2021 and showed \"L5-S1 compression\". Pt occasionally takes Motrin for her pain symptoms. Pt had lumbar spine injections in 2020 and again 21, but they helped for only about 1.5 weeks each time. Pt reports maybe a little weakness in her L LE, such as her L foot dragging a little once in a while. Pt has stairs at home and reports no problems with ascending/descending reciprocally.   Pt reports that her LBP/L LE pain do not awaken her at night, but she does have some trouble falling asleep due to insomnia; pt reports that her back is \"stiff\" in the mornings with getting out of bed and beginning to move around. Pt does a little Yoga using a fitness [employer]har[employer] and walks a few miles each day; she has access to fitness equipment/facility. Pt has a dog that she walks and it is fairly well behaved on leash. PT for her LBP was helpful in the past.    Objective Findings:    Functional Mobility:  Sit <==> Stand is [facility] without difficulty or report of symptom changes; SLS is OK and good balance L and R; Heel Raises (ankle PF in SLS) is [employer] [facility] [place] and OK left and right; Heel Walking = WFL and OK bilat; Squat = full depth and OK, she arises without difficulty. Gait:  Mild vaulting up on L LE in stance phase--possible LLD, left > right? Alignment (standing):  Lumbar lordosis WFL; GTs level, left IC higher versus right; left ASIS up/right ASIS down. Trunk AROM (standing):  Flex = reaches finger tips to mid-shins, decreased thoracic motion, but reverses lumbar lordosis, pt reports bilat HS tightness; Ext = decreased by about 50% and low back discomfort; Lat Flex = WFL bilat, to R slightly > to L (inferior patellar pole versus superior pole), \"tight\" left low back with motion to R and OK for motion to L; and Rot: = about full motion to R and OK, decreased by about 25% and \"tight\" for motion to L. Posture (sitting):  Erect is OK, no immediate symptom changes with flexed/slouched sitting, but pt reports that it would bother her after a while; full knee ext AROM in sitting with reports of HS tightness L > R.  DTRs (sitting):  Patellar Tendons = 3+ bilat; Achilles Tendons = 1= to 2+ bilat. Manual Muscle Testing (isonmetric hold in mid-range): Ankle INV and EV (seated) = 5/5 bilat; Knee Flex (seated) = 5/5 and OK bilat; Knee Ext (seated = 5/5 and OK bilat; Hip ER (seated) = 5/5 and OK bilat;  Hip IR (seated) = 5/5 and OK bilat; Hip Flex (reclines sitting) = 5/5 and OK bilat; and Hip Ext (prone SLR) = 5- to 5/5 right and some discomfort, 5/5 and OK left. Alignment (supine):  Left leg longer versus right (medial malleoli); left ASIS higher than right. LE PROM and Flexibility:  Hip Abduction/ADductors (supine) = WFL and OK bilat; Hip Flex (supine KTC) = full motion and OK bilat; Hip ER (@90 deg hip flex supine) = about 75 to 80 deg, L < R, and OK bilat; Hip IR (@90 deg hip flex supine) = WFL and OK bilat; Hip Ext (prone SLR) = WFL and OK bilat; Hamstrings (knee ext PROM at 90 deg hip flex supine) = about -10 to -15 deg R and about -15 to -20 deg L, OK bilat; Quadriceps (prone) = good and OK bilat. Palpation:  Tender and increased muscle tension left quadratus lumborum and left posterior gluteals, mildly tender left posterior thigh/HS. [place] Pt instructed in HEP and will f/u in clinic for PT.   Pt demonstrates signs and symptoms consistent with low back pain with L LE sciatica.  ==================================================================================  Eval Complexity: History MEDIUM  Complexity : 1-2 comorbidities / personal factors will impact the outcome/ POC ;  Examination  MEDIUM Complexity : 3 Standardized tests and measures addressing body structure, function, activity limitation and / or participation in recreation ; Presentation LOW Complexity : Stable, uncomplicated ;  Decision Making MEDIUM Complexity : FOTO score of 26-74; Overall Complexity LOW   Problem List: pain affecting function, decrease ROM, decrease ADL/ functional abilitiies, decrease activity tolerance and decrease flexibility/ joint mobility   Treatment Plan may include any combination of the following: Therapeutic exercise, Therapeutic activities, Neuromuscular re-education, Physical agent/modality, Gait/balance training, Manual therapy, Patient education, Self Care training, Functional mobility training and Home safety training  Patient / Family readiness to learn indicated by: asking questions, trying to perform skills and interest  Persons(s) to be included in education: patient (P)  Barriers to Learning/Limitations: None  Measures taken, if barriers to learning: NA   Patient Goal (s): \"decrease pain\"     Rehabilitation Potential:  Good.  Short Term Goals: To be accomplished in  3-4  weeks:  1. Independent with HEP for trunk and LE ROM and flexibility improve ability to perform ADLs. 2. Decrease max pain 25-50% to assist with ADLs and work tasks. 3. Improve FOTO score by >/= 5 points to improve tolerance for ADL, work, household chore, pet care, and recreation tasks. 4. No pelvic asymmetries. 5. Increase AROM for extension in standing to decreased by </= 25%; rotation to L = R.     Long Term Goals: To be accomplished in  6-8  weeks:  1. Decrease max pain 50-75% and no radicular pain to assist with tolerance for ADL, work, household chore, pet care, and recreation tasks. 2.  Improve FOTO score by >/= 11 points to improve return to tolerance for ADL, work, household chore, pet care, and recreation tasks. 3.  Will rate a +5 on Global Rating of Change and be prepared to DC to HEP. 4. Increase AROM for trunk flex in standing to reaches finger tips to ankles. 5. No/minimal tenderness or muscle hypertonicity to palpation. 6. Pt able to utilize principles of good posture/body mechanics and techniques of core bracing/lumbar stabilization for ADLs, chores, and work to prevent re-injury. Frequency / Duration:   Patient to be seen  2 times per week for 4-8 weeks. Patient / Caregiver education and instruction: self care, activity modification and exercises    Therapist Signature: Mino Momin PT Date: 72/0/3569   Certification Period: NA Time: 3:11 PM   ===========================================================================================  I certify that the above Physical Therapy Services are being furnished while the patient is under my care.   I agree with the treatment plan and certify that this therapy is necessary. Physician Signature:        Date:       Time:     Quinn Delarcuz MD    Please sign and return to [facility] or you may fax the signed copy to [phone]. Thank you.

## 2021-10-06 ENCOUNTER — HOSPITAL ENCOUNTER (OUTPATIENT)
Dept: PHYSICAL THERAPY | Age: 56
Discharge: HOME | End: 2021-10-06
Payer: COMMERCIAL

## 2021-10-06 PROCEDURE — 97140 MANUAL THERAPY 1/> REGIONS: CPT

## 2021-10-06 PROCEDURE — 97110 THERAPEUTIC EXERCISES: CPT

## 2021-10-06 PROCEDURE — 97112 NEUROMUSCULAR REEDUCATION: CPT

## 2021-10-06 NOTE — PROGRESS NOTES
PHYSICAL THERAPY - DAILY TREATMENT NOTE     Patient Name: Robb Prabhakar        Date: 10/6/2021  : 1965   YES Patient  Verified  Visit #:   2   of   12  Insurance: Payor: Kwame Model / Plan: [place] [employer] PPO / Product Type: PPO /      In time: 415 Out time: 505   Total Treatment Time: 50     Medicare/[facility] Time Tracking (below)   Total Timed Codes (min):   1:1 Treatment Time:       TREATMENT AREA =  Low back pain [M54.50]    SUBJECTIVE    Pain Level (on 0 to 10 scale):  3  / 10   Medication Changes/New allergies or changes in medical history, any new surgeries or procedures? NO    If yes, update Summary List   Subjective Functional Status/Changes:  []  No changes reported   LBP and L buttock.   Pain is constant           OBJECTIVE  Modalities Rationale:     decrease inflammation and decrease pain to improve patient's ability to perform ADLs      min [] Estim, type/location:                                      []  att     []  unatt     []  w/US     []  w/ice    []  w/heat    min []  Mechanical Traction: type/lbs                   []  pro   []  sup   []  int   []  cont    []  before manual    []  after manual    min []  Ultrasound, settings/location:      min []  Iontophoresis w/ dexamethasone, location:                                               []  take home patch       []  in clinic   10 min [x]  Ice     []  Heat    location/position:     min []  Vasopneumatic Device, press/temp:              cold / med If using vaso       pre-treatment girth :       post-treatment girth :       measured at (location) :     min []  Other:    [] Skin assessment post-treatment (if applicable):    []  intact    []  redness- no adverse reaction     []redness  adverse reaction:      10 min Manual Therapy: Technique:      [x] S/DTM []IASTM []PROM [] Passive Stretching   [x]manual TPR  [] SOR [] man traction  []Jt manipulation:Gr I [] II []  III [] IV[]   [] OP with REIL    []   Treatment Area: LB       *manual therapy interventions were performed at a separate and distinct time from   the therapeutic activities interventions. Rationale:      decrease pain, increase ROM, increase tissue extensibility and decrease trigger points to improve patient's ability to perform ADLs with les pain    15 min Therapeutic Exercise:  [x]  See flow sheet   Rationale:      increase ROM, increase strength and dec neural compromise to improve the patients ability to perform ADLs     15 min Neuromuscular Re-ed: [x]  See flow sheet   Rationale:      inc stability, dec neural compromise to improve the patients ability to perform ADLs       Billed With/As:   [x] TE   [] TA   [x] Neuro   [] Self Care Patient Education: [x] Review HEP    [x] Progressed/Changed HEP based on:   [x] positioning   [x] body mechanics   [] transfers   [] heat/ice application    [] other:        Other Objective/Functional Measures:    L SGIS: dec, B    Added L SGIS to HEP. Pt ed to perform SG f/b REIL every 2 hours to reduce Sx   Post Treatment Pain Level (on 0 to 10) scale:   0  / 10     ASSESSMENT  Assessment/Changes in Function:      Functional improvement:  progressed HEP; able to dec Sx with HEP  Functional limitation:  persistent L radic           Patient will continue to benefit from skilled PT services to modify and progress therapeutic interventions, address functional mobility deficits, address ROM deficits, address strength deficits, analyze and address soft tissue restrictions, analyze and cue movement patterns and analyze and modify body mechanics/ergonomics to attain remaining goals.    Progress toward goals / Updated goals:    Benefits from L SG and REIL/S         []  See Progress Note/Recertification    PLAN    [x]  Upgrade activities as tolerated {YES) Continue plan of care   []  Discharge due to :    []  Other:      Therapist: Yane Cordova PT    Date: 10/6/2021 Time: 4:16 PM     Future Appointments   Date Time Provider Melo De La Vega   10/11/2021 3:00 PM Aleida Kahn, PT [facility]   10/14/2021  8:15 AM Armani Howard, PT [facility]   10/20/2021  2:15 PM Aleida Kahn, PT [facility]   10/25/2021  2:15 PM Aleida Kahn, PT [facility]   10/27/2021  2:15 PM Aleida Criss, PT [facility]

## 2021-10-11 ENCOUNTER — HOSPITAL ENCOUNTER (OUTPATIENT)
Dept: PHYSICAL THERAPY | Age: 56
Discharge: HOME | End: 2021-10-11
Payer: COMMERCIAL

## 2021-10-11 PROCEDURE — 97112 NEUROMUSCULAR REEDUCATION: CPT

## 2021-10-11 PROCEDURE — 97530 THERAPEUTIC ACTIVITIES: CPT

## 2021-10-11 PROCEDURE — 97110 THERAPEUTIC EXERCISES: CPT

## 2021-10-11 NOTE — PROGRESS NOTES
PHYSICAL THERAPY - DAILY TREATMENT NOTE     Patient Name: Marcia Patel        Date: 10/11/2021  : 1965   YES Patient  Verified  Visit #:   3   of   12  Insurance: Payor: Jaquelin Ling / Plan: [place] [employer] PPO / Product Type: PPO /      In time: 3:00 PM Out time: 4:07 PM   Total Treatment Time: 67     Medicare/[facility] Time Tracking (below)   Total Timed Codes (min):  NA 1:1 Treatment Time:  NA     TREATMENT AREA =  Low back pain [M54.50]    SUBJECTIVE    Pain Level (on 0 to 10 scale):  2  / 10   Medication Changes/New allergies or changes in medical history, any new surgeries or procedures? NO    If yes, update Summary List   Subjective Functional Status/Changes:  []  No changes reported     Pt reports last PT session went well. Currently her LBP feels \"about the same\" and she indicates discomfort left posterior gluteal and posterior thigh areas; no distal LE symptoms. OBJECTIVE  Modalities Rationale:     decrease edema, decrease inflammation, decrease pain and increase tissue extensibility to improve patient's ability to perform work, ADL, household chore, community mobility, and recreation/fitness tasks. min [] Estim, type/location:                                      []  att     []  unatt     []  w/US     []  w/ice    []  w/heat    min []  Mechanical Traction: type/lbs                   []  pro   []  sup   []  int   []  cont    []  before manual    []  after manual    min []  Ultrasound, settings/location:      min []  Iontophoresis w/ dexamethasone, location:                                               []  take home patch       []  in clinic   10 min []  Ice     [x]  Heat    location/position: L-S and left posterior gluteal areas pre-Tx/prone.     min []  Vasopneumatic Device, press/temp:    If using vaso (only need to measure limb vaso being performed on)      pre-treatment girth :       post-treatment girth :       measured at (landmark location) :      min []  Other:    [x] Skin assessment post-treatment (if applicable):    [x]  intact    []  redness- no adverse reaction                  []redness  adverse reaction:      15 min Therapeutic Exercise:  [x]  See flow sheet   Rationale:      increase ROM and increase strength to improve the patients ability to perform work, ADL, household chore, community mobility, and recreation/fitness tasks. 12 min Therapeutic Activity: [x]  See flow sheet   Rationale:      increase ROM, increase strength, improve coordination and increase proprioception to improve the patients ability to perform work, ADL, household chore, community mobility, and recreation/fitness tasks. 30 min Neuromuscular Re-ed: [x]  See flow sheet   Rationale:      increase strength, improve coordination and increase proprioception to improve the patients ability to perform work, ADL, household chore, community mobility, and recreation/fitness tasks. Billed With/As:   [x] TE   [x] TA   [x] Neuro   [x] Self Care Patient Education: [x] Review HEP    [x] Progressed/Changed HEP based on:   [x] positioning   [x] body mechanics   [] transfers   [] heat/ice application    [x] other:  Pt instructed in, PT denmonstrated, and patient performed lumbar stabilization exercises; pt given handouts with pictures and written directions for same and copies placed in pt's chart. Other Objective/Functional Measures:    Increased trunk ext ROM in standing and prone. Post Treatment Pain Level (on 0 to 10) scale:   ?  / 10--Not Reassessed. ASSESSMENT    Assessment/Changes in Function:     Some decreased in L LE symptoms with prone progression/press-ups. Some cuing needed for correct form with stabilization exercises.      []  See Progress Note/Recertification   Patient will continue to benefit from skilled PT services to modify and progress therapeutic interventions, address functional mobility deficits, address ROM deficits, address strength deficits, analyze and address soft tissue restrictions, analyze and cue movement patterns, analyze and modify body mechanics/ergonomics, assess and modify postural abnormalities and instruct in home and community integration to attain remaining goals. Progress toward goals / Updated goals:    Good Progress to    [x] STG    [] LTG  1, 2, and 5 as shown by performance of HEP items, pain scale reporting, and observed motion during PT session. PLAN    [x]  Upgrade activities as tolerated YES Continue plan of care   []  Discharge due to :    [x]  Other: Try using Stabilizer for feed back with core bracing activities.      Therapist: Giorgi Dailey, PT    Date: 10/11/2021 Time: 7:22 AM     Future Appointments   Date Time Provider Melo De La Vega   10/11/2021  3:00 PM Sukhdev Point, PT [facility]   10/14/2021  8:15 AM Rochelle Rashid, PT [facility]   10/20/2021  2:15 PM Sukhdev Point, PT [facility]   10/25/2021  2:15 PM Sukhdev Point, PT [facility]   10/27/2021  2:15 PM Sukhdev Point, PT [facility]

## 2021-10-14 ENCOUNTER — HOSPITAL ENCOUNTER (OUTPATIENT)
Dept: PHYSICAL THERAPY | Age: 56
Discharge: HOME | End: 2021-10-14
Payer: COMMERCIAL

## 2021-10-14 PROCEDURE — 97110 THERAPEUTIC EXERCISES: CPT

## 2021-10-14 PROCEDURE — 97140 MANUAL THERAPY 1/> REGIONS: CPT

## 2021-10-14 PROCEDURE — 97112 NEUROMUSCULAR REEDUCATION: CPT

## 2021-10-14 NOTE — PROGRESS NOTES
[address] [facility] PHYSICAL THERAPY AT [address] [facility], [address] [place] [address], [address], [address] [place] [address]  Phone: [phone]  Fax: [phone]  PROGRESS NOTE  Patient Name: Iraida Dubois : 1965   Treatment/Medical Diagnosis: Low back pain [M54.50]   Referral Source: Daniele Xiong MD     Date of Initial Visit: 10/4/21 Attended Visits: 4 Missed Visits: -     SUMMARY OF TREATMENT  PT has consisted of therapeutic exercise (Phillip extension with relevant lat component); therapeutic activity, neuro re-ed, modalities, pt education of body mechanics, ergonomics, posture   CURRENT STATUS  Patient is progressing well through this course of PT, reporting 50% improvement in symptoms. Symptoms are more intermittent and less intense. GROC score= +3 (somewhat better). ROM and core stability are steadily improving. She has been educated on office ergonomics and good posture, which she is trying to incorporate into her ADLs. Previous Goals:  1. Independent with HEP for trunk and LE ROM and flexibility improve ability to perform ADLs. 2. Decrease max pain 25-50% to assist with ADLs and work tasks. 3. Improve FOTO score by >/= 5 points to improve tolerance for ADL, work, household chore, pet care, and recreation tasks. 4. No pelvic asymmetries. 5. Increase AROM for extension in standing to decreased by </= 25%     Prior Level/Current Level:  1) Prior Level: na   Current Level: indep with current HEP   Goal Met? yes  2) Prior Level: na   Current Level: 50% improved   Goal Met? yes  3) Prior Level: 55   Current Level: 57 (increase of 2)   Goal Met? progressing  4) Prior Level: Left leg longer versus right (medial malleoli); left ASIS higher than right. Current Level: symmetric   Goal Met? yes  5) Prior Level: decreased 50%   Current Level: ext= WNL (prone); 75% (stand)   Goal Met? yes    New Goals to be achieved in __4__  weeks:  1.   Decrease max pain 50-75% and no radicular pain to assist with tolerance for ADL, work, household chore, pet care, and recreation tasks. 2.  Improve FOTO score by >/= 11 points to improve return to tolerance for ADL, work, household chore, pet care, and recreation tasks. 3.  Will rate a +5 on Global Rating of Change and be prepared to DC to HEP. 4. Increase AROM for trunk flex in standing to reaches finger tips to ankles. 5. No/minimal tenderness or muscle hypertonicity to palpation. 6. Pt able to utilize principles of good posture/body mechanics and techniques of core bracing/lumbar stabilization for ADLs, chores, and work to prevent re-injury    Assessments/Recommendations: Other: continue PT per current POC    If you have any questions/comments please contact us directly at [phone]. Thank you for allowing us to assist in the care of your patient. Therapist Signature: Pasquale Benito PT Date: 10/14/2021   Reporting Period:  Certification Period:    Time: 8:37 AM   NOTE TO PHYSICIAN:  PLEASE COMPLETE THE ORDERS BELOW AND FAX TO   [facility] at [address]: [phone]. If you are unable to process this request in 24 hours please contact our office: [phone].    ___ I have read the above report and request that my patient continue as recommended.   ___ I have read the above report and request that my patient continue therapy with the following changes/special instructions:_________________________________________   ___ I have read the above report and request that my patient be discharged from therapy.      Physician Signature:        Date:       Time:       Honey Soto MD

## 2021-10-14 NOTE — PROGRESS NOTES
PHYSICAL THERAPY - DAILY TREATMENT NOTE     Patient Name: Roro Kimbrough        Date: 10/14/2021  : 1965   YES Patient  Verified  Visit #:     Insurance: Payor: Caroline Pennington / Plan: [place] [employer] [place] / Product Type: PPO /      In time: 815 Out time: 905   Total Treatment Time: 50     Medicare/[facility] Time Tracking (below)   Total Timed Codes (min):   1:1 Treatment Time:       TREATMENT AREA =  Low back pain [M54.50]    SUBJECTIVE    Pain Level (on 0 to 10 scale):  2  / 10   Medication Changes/New allergies or changes in medical history, any new surgeries or procedures? NO    If yes, update Summary List   Subjective Functional Status/Changes:  []  No changes reported   Better. Current Sx in LB and L buttock. Times when I don't notice the pain.     Overall 50% improved           OBJECTIVE  Modalities Rationale:     decrease pain and increase tissue extensibility to improve patient's ability to perform ADLs with less pain      min [] Estim, type/location:                                      []  att     []  unatt     []  w/US     []  w/ice    []  w/heat    min []  Mechanical Traction: type/lbs                   []  pro   []  sup   []  int   []  cont    []  before manual    []  after manual    min []  Ultrasound, settings/location:      min []  Iontophoresis w/ dexamethasone, location:                                               []  take home patch       []  in clinic   10 min []  Ice     [x]  Heat    location/position:     min []  Vasopneumatic Device, press/temp:              cold / med If using vaso       pre-treatment girth :       post-treatment girth :       measured at (location) :     min []  Other:    [x] Skin assessment post-treatment (if applicable):    [x]  intact    []  redness- no adverse reaction     []redness  adverse reaction:      10 min Manual Therapy: Technique:      [x] S/DTM []IASTM []PROM [] Passive Stretching   [x]manual TPR  [] SOR [] man traction  []Jt manipulation:Gr I [] II []  III [] IV[]   [x] OP with REIL    []   Treatment Area:LB/ L glut region        *manual therapy interventions were performed at a separate and distinct time from   the therapeutic activities interventions.    Rationale:      decrease pain, increase ROM, increase tissue extensibility, decrease trigger points, increase postural awareness and dec neural compromise to improve patient's ability to perform ADLs with less pain    15 min Therapeutic Exercise:  [x]  See flow sheet   Rationale:      increase ROM and increase strength to improve the patients ability to perform ADLs with less pain     15 min Neuromuscular Re-ed: [x]  See flow sheet   Rationale:      inc stability, dec neural compromise to improve the patients ability to perform ADLs with less pain         Billed With/As:   [x] TE   [x] TA   [] Neuro   [] Self Care Patient Education: [x] Review HEP    [] Progressed/Changed HEP based on:   [] positioning   [] body mechanics   [] transfers   [] heat/ice application    [] other:        Other Objective/Functional Measures:    Progressed core stability ex    GROC= +3   Post Treatment Pain Level (on 0 to 10) scale:   0  / 10     ASSESSMENT    [x]  See Progress Note/Recertification    PLAN    [x]  Upgrade activities as tolerated {YES) Continue plan of care   []  Discharge due to :    []  Other:      Therapist: Carolann Lee PT    Date: 10/14/2021 Time: 8:13 AM     Future Appointments   Date Time Provider Melo De La Vega   10/14/2021  8:15 AM Reed Vivar PT [facility]   10/20/2021  2:15 PM Celestina Robin, PT [facility]   10/25/2021  2:15 PM Celestina Robin, PT [facility]   10/27/2021  2:15 PM Celestina Robin, PT [facility]

## 2021-10-20 ENCOUNTER — HOSPITAL ENCOUNTER (OUTPATIENT)
Dept: PHYSICAL THERAPY | Age: 56
Discharge: HOME | End: 2021-10-20
Payer: COMMERCIAL

## 2021-10-20 PROCEDURE — 97112 NEUROMUSCULAR REEDUCATION: CPT

## 2021-10-20 PROCEDURE — 97110 THERAPEUTIC EXERCISES: CPT

## 2021-10-20 NOTE — PROGRESS NOTES
PHYSICAL THERAPY - DAILY TREATMENT NOTE      Patient Name: Reji Chaparro                                   Date: 10/20/2021  : 1965                                  YES Patient  Verified  Visit #:                     Insurance: Payor: Marcus Bright / Plan: [place] [employer] P[place] / Product Type: PPO /       In time: 2:15 PM Out time: 3:20 PM   Total Treatment Time: 65          Medicare/[facility] Time Tracking (below)   Total Timed Codes (min):  NA 1:1 Treatment Time:  NA      TREATMENT AREA =  Low back pain [M54.50]     SUBJECTIVE     Pain Level (on 0 to 10 scale):  3  / 10   Medication Changes/New allergies or changes in medical history, any new surgeries or procedures? NO    If yes, update Summary List   Subjective Functional Status/Changes:  []? No changes reported      Pt reports being OK after last PT session. Pt worked earlier today and has had somewhat increased left LBP all day.            OBJECTIVE  Modalities Rationale:     decrease edema, decrease inflammation, decrease pain and increase tissue extensibility to improve patient's ability to perform work, ADL, household chore, community mobility, and recreation/fitness tasks.                 min []? Estim, type/location:                                                            []?  att     []?  unatt     []?  w/US     []?  w/ice    []?  w/heat     min []? Mechanical Traction: type/lbs                    []?  pro   []?  sup   []? int   []?  cont    []? before manual    []? after manual     min []? Ultrasound, settings/location:        min []? Iontophoresis w/ dexamethasone, location:                                                []?  take home patch       []? in clinic   10 min []? Ice     [x]? Heat    location/position: L-S and posterior gluteal areas pre-Tx/prone.     min []?   Vasopneumatic Device, press/temp:     If using vaso (only need to measure limb vaso being performed on)      pre-treatment girth :       post-treatment girth : measured at (landmark location) :       min []? Other:     [x]? Skin assessment post-treatment (if applicable):    [x]? intact    []? redness- no adverse reaction                  []?redness  adverse reaction:        20 min Therapeutic Exercise:  [x]? See flow sheet   Rationale:      increase ROM and increase strength to improve the patients ability to perform work, ADL, household chore, community mobility, and recreation/fitness tasks.      35 min Neuromuscular Re-ed: [x]? See flow sheet   Rationale:      increase strength, improve coordination and increase proprioception to improve the patients ability to perform work, ADL, household chore, community mobility, and recreation/fitness tasks.       Billed With/As:   []? TE   []? TA   []? Neuro   []? Self Care Patient Education: [x]? Review HEP    []? Progressed/Changed HEP based on:   []? positioning   [x]? body mechanics   []? transfers   []? heat/ice application    []? other:               Other Objective/Functional Measures:     Increased trunk ext ROM in prone; full motion bilat for supine KTC; WFL bilat for LTRs; and good bilat HS flexibility by SLR with strap in supine.    Post Treatment Pain Level (on 0 to 10) scale:   2  / 10      ASSESSMENT     Assessment/Changes in Function:      Pt felt a little better post-Tx. Good form but challenged by stabilization exercises. .      []? See Progress Note/Recertification   Patient will continue to benefit from skilled PT services to modify and progress therapeutic interventions, address functional mobility deficits, address ROM deficits, address strength deficits, analyze and address soft tissue restrictions, analyze and cue movement patterns, analyze and modify body mechanics/ergonomics, assess and modify postural abnormalities and instruct in home and community integration to attain remaining goals. Progress toward goals / Updated goals:     Good Progress to    [x]?  STG    []? LTG  1 and 2 as shown by performance of HEP items, pain scale reporting, and observed motion during PT session.         PLAN           [x]? Upgrade activities as tolerated YES Continue plan of care   []? Discharge due to :     [x]?   Other: Try using Stabilizer for feed back with core bracing activities; progress and issue handouts.            Therapist: Martita Lam, PT     Date: 10/20/2021 Time: 7:19 AM

## 2021-10-25 ENCOUNTER — HOSPITAL ENCOUNTER (OUTPATIENT)
Dept: PHYSICAL THERAPY | Age: 56
Discharge: HOME | End: 2021-10-25
Payer: COMMERCIAL

## 2021-10-25 PROCEDURE — 97112 NEUROMUSCULAR REEDUCATION: CPT

## 2021-10-25 PROCEDURE — 97110 THERAPEUTIC EXERCISES: CPT

## 2021-10-25 PROCEDURE — 97530 THERAPEUTIC ACTIVITIES: CPT

## 2021-10-25 NOTE — PROGRESS NOTES
PHYSICAL THERAPY - DAILY TREATMENT NOTE     Patient Name: Moreno Travis        Date: 10/25/2021  : 1965   YES Patient  Verified  Visit #:      12  Insurance: Payor: [facility] / Plan: [place] [employer] PPO / Product Type: PPO /      In time: 2:35 PM Out time: 3:35 PM   Total Treatment Time: 60 min     Medicare/[facility] Time Tracking (below)   Total Timed Codes (min):  NA 1:1 Treatment Time:  NA     TREATMENT AREA =  Low back pain [M54.50]    SUBJECTIVE    Pain Level (on 0 to 10 scale): 0 / 10   Medication Changes/New allergies or changes in medical history, any new surgeries or procedures? NO    If yes, update Summary List   Subjective Functional Status/Changes:  []  No changes reported     Pt reports being OK after last PT session. Pt denies any pain currently; occasionally a little L LE tingling. Pt late for appointment due to work schedule conflict. OBJECTIVE  Modalities Rationale:     decrease inflammation, decrease pain and increase tissue extensibility to improve patient's ability to perform work, ADL, household chore, community mobility, and recreation/fitness tasks.    min [] Estim, type/location:                                      []  att     []  unatt     []  w/US     []  w/ice    []  w/heat    min []  Mechanical Traction: type/lbs                   []  pro   []  sup   []  int   []  cont    []  before manual    []  after manual    min []  Ultrasound, settings/location:      min []  Iontophoresis w/ dexamethasone, location:                                               []  take home patch       []  in clinic   10 min []  Ice     [x]  Heat    location/position: Lumbosacral area pre-Tx/prone.     min []  Vasopneumatic Device, press/temp:    If using vaso (only need to measure limb vaso being performed on)      pre-treatment girth :       post-treatment girth :       measured at (landmark location) :      min []  Other:    [x] Skin assessment post-treatment (if applicable):    [x]  intact []  redness- no adverse reaction                  []redness  adverse reaction:      10 min Therapeutic Exercise:  [x]  See flow sheet   Rationale:      increase ROM and increase strength to improve the patients ability to perform work, ADL, household chore, community mobility, and recreation/fitness tasks.      22 min Therapeutic Activity: [x]  See flow sheet   Rationale:      increase ROM, increase strength, improve coordination and increase proprioception to improve the patients ability to perform work, ADL, household chore, community mobility, and recreation/fitness tasks.      18 min Neuromuscular Re-ed: [x]  See flow sheet   Rationale:      increase strength, improve coordination and increase proprioception to improve the patients ability to perform work, ADL, household chore, community mobility, and recreation/fitness tasks.      Billed With/As:   [] TE   [] TA   [] Neuro   [] Self Care Patient Education: [x] Review HEP    [] Progressed/Changed HEP based on:   [] positioning   [] body mechanics   [] transfers   [] heat/ice application    [] other:        Other Objective/Functional Measures:    Good form for stabilization exercises and good balance/stability on T-ball and on foam roller. Post Treatment Pain Level (on 0 to 10) scale:   0  / 10     ASSESSMENT    Assessment/Changes in Function:     Good form/control with functional simulations--bike, elliptical, TM walk/push, T-band pushing and pulling. []  See Progress Note/Recertification   Patient will continue to benefit from skilled PT services to modify and progress therapeutic interventions, address functional mobility deficits, address ROM deficits, address strength deficits, analyze and address soft tissue restrictions, analyze and cue movement patterns, analyze and modify body mechanics/ergonomics, assess and modify postural abnormalities and instruct in home and community integration to attain remaining goals.       Progress toward goals / Updated goals:    Good Progress to    [] STG    [x] LTG  1 and 6 as shown by pain scale reporting and observed performance of stabilization exercises and functional simulations.        PLAN    [x]  Upgrade activities as tolerated YES Continue plan of care   []  Discharge due to :    [x]  Other: Try T-ball wall squats, inverted BOSU, Body Blade, more T-ball exercises, etc.     Therapist: Yair Lynch PT    Date: 10/25/2021 Time: 7:06 AM     Future Appointments   Date Time Provider Melo De La Vega   10/25/2021  2:15 PM Galo Del Cid, PT [facility]   10/27/2021  2:15 PM Galo Del Cid PT [facility]

## 2021-10-27 ENCOUNTER — HOSPITAL ENCOUNTER (OUTPATIENT)
Dept: PHYSICAL THERAPY | Age: 56
Discharge: HOME | End: 2021-10-27
Payer: COMMERCIAL

## 2021-10-27 PROCEDURE — 97110 THERAPEUTIC EXERCISES: CPT

## 2021-10-27 PROCEDURE — 97112 NEUROMUSCULAR REEDUCATION: CPT

## 2021-10-27 NOTE — PROGRESS NOTES
PHYSICAL THERAPY - DAILY TREATMENT NOTE      Patient Name: Teresita Churchill                                   Date: 10/27/2021  : 1965                                  YES Patient  Verified  Visit #:                     Insurance: Payor: Zari Matthews / Plan: [place] [employer] PPO / Product Type: PPO /       In time: 2:12 PM Out time: 3:08 PM   Total Treatment Time: 56 min          Medicare/[facility] Time Tracking (below)   Total Timed Codes (min):  NA 1:1 Treatment Time:  NA      TREATMENT AREA =  Low back pain [M54.50]     SUBJECTIVE     Pain Level (on 0 to 10 scale):  3 / 10   Medication Changes/New allergies or changes in medical history, any new surgeries or procedures? NO    If yes, update Summary List   Subjective Functional Status/Changes:  []? No changes reported      Pt reports some left LBP and sciatica into L buttock and posterior thigh currently.         OBJECTIVE  Modalities Rationale:     decrease inflammation, decrease pain and increase tissue extensibility to improve patient's ability to perform work, ADL, household chore, community mobility, and recreation/fitness tasks.                 min []? Estim, type/location:                                                            []?  att     []?  unatt     []?  w/US     []?  w/ice    []?  w/heat     min []? Mechanical Traction: type/lbs                    []?  pro   []?  sup   []? int   []?  cont    []? before manual    []? after manual     min []? Ultrasound, settings/location:        min []? Iontophoresis w/ dexamethasone, location:                                                []?  take home patch       []? in clinic   10 min []? Ice     [x]? Heat    location/position: Lumbosacral area pre-Tx/prone.     min []? Vasopneumatic Device, press/temp:     If using vaso (only need to measure limb vaso being performed on)      pre-treatment girth :       post-treatment girth :       measured at (landmark location) :       min []?   Other:   [x]? Skin assessment post-treatment (if applicable):    [x]? intact    []? redness- no adverse reaction                  []?redness - adverse reaction:      30 min Therapeutic Exercise:  [x]? See flow sheet   Rationale:      increase ROM and increase strength to improve the patients ability to perform work, ADL, household chore, community mobility, and recreation/fitness tasks.       16 min Neuromuscular Re-ed: [x]? See flow sheet   Rationale:      increase strength, improve coordination and increase proprioception to improve the patients ability to perform work, ADL, household chore, community mobility, and recreation/fitness tasks.       Billed With/As:   [x]? TE   []? TA   [x]? Neuro   [x]? Self Care Patient Education: [x]? Review HEP    [x]? Progressed/Changed HEP based on:   [x]? positioning   []? body mechanics   []? transfers   []? heat/ice application    [x]? other:  Pt instructed in and performed positional lumbar traction/distraction in R sidelying; added to HEP and patient given handout with picture/written directions and copy placed in chart.             Other Objective/Functional Measures:    Pt felt that positional traction/distraction gave good relief of pain.      Post Treatment Pain Level (on 0 to 10) scale:   2-3 / 10      ASSESSMENT     Assessment/Changes in Function:      Good form/control with stabilization exercises; nerve glides are unpleasant.      []? See Progress Note/Recertification   Patient will continue to benefit from skilled PT services to modify and progress therapeutic interventions, address functional mobility deficits, address ROM deficits, address strength deficits, analyze and address soft tissue restrictions, analyze and cue movement patterns, analyze and modify body mechanics/ergonomics, assess and modify postural abnormalities and instruct in home and community integration to attain remaining goals.       Progress toward goals / Updated goals:     Good Progress to []? STG    [x]? LTG  1 and 6 as shown by pain scale reporting and observed performance of stabilization exercises and functional simulations.         PLAN           [x]? Upgrade activities as tolerated YES Continue plan of care   []? Discharge due to :     [x]?   Other: Try T-ball wall squats, inverted BOSU, Body Blade, more T-ball exercises, etc.            Therapist: Charlene Burnette PT     Date: 10/27/2021 Time: 7:15 AM

## 2022-01-07 NOTE — PROGRESS NOTES
[address] [place] [address] PHYSICAL THERAPY AT [facility] [address], [address]  Phone: [phone]  Fax: [phone] SUMMARY  Patient Name: Wisam Carney : 1965   Treatment/Medical Diagnosis: Other low back pain [M54.59]; Left LE Radicular Pain   Referral Source: Mark Galindo MD     Date of Initial Visit: 10/04/2021 Attended Visits: 7 Missed Visits: 0     SUMMARY OF TREATMENT:    Patient was seen in PT for Initial Evaluation on 10/04/21 and instructed in beginning HEP for sitting posture and use of lumbar roll to support lordosis, Phillip standing and prone lumbar extension exercises, and ant/post pelvic tilts in hooklying for finding lumbar neutral and core bracing to stabilize spine in neutral handouts given with pictures and written directions. Later treatment sessions (6 from 10/06 through 10/27) consisted of MHP to L-S area in prone, LE and trunk ROM/stretching, LE and core strengthening with core stabilization in neutral posture, and use of core stabilization in neutral posture for ADLs (posture and body mechanics training) with progression of HEP for the same. CURRENT STATUS:    At last attended PT session on 10/27/21, patient reported \"some left LBP and sciatica into L buttock and posterior thigh currently\" and Therapist noted \"Pt felt that positional traction/distraction gave good relief of pain; good form/control with stabilization exercises; nerve glides are unpleasant. \"  Patient did not attend any further PT sessions and Reassessment planned for 10th visit/30 days post-Initial Evaluation was never performed. Patient has not had any further contact with our office and her status is unknown. Goal/Measure of Progress Goal Met?   1.  1. Independent with HEP for trunk and LE ROM and flexibility improve ability to perform ADLs. Status at last Eval: No HEP. Current Status: Patient proficient and compliant with HEP. Yes/Progressing. 2.  2. Decrease max pain 25-50% to assist with ADLs and work tasks. Status at last Eval: Was 2-5/10. Current Status: Periods of 0/10 pain, but 3/10 at last PT session. Yes/Progressing. 3.  3. Improve FOTO score by >/= 5 points to improve tolerance for ADL, work, household chore, pet care, and recreation tasks. Status at last Eval: Was 55/100 at Initial Eval on 10/04/21 Current Status: 57/100 on 10/14/21; not reassessed further. No/Progressing. 4.  4. No pelvic asymmetries. 5. Increase AROM for extension in standing to decreased by </= 25%; rotation to L = R.   Status at last Eval: 4. Was left posterior pelvic torsion. 5. Was Ext decreased by ~50% with LBP and L Rotation decreased by ~25%. Current Status: 4. Pelvis Symmetrical on 10/14/21; .  5. Ext decreased ~25% on 10/14/21 and not reassessed later; Rot not reassessed then or later. 4. Yes.  5. Partially Met/Progressing. · Long Term Goals: To be accomplished in  6-8  weeks:    1. Decrease max pain 50-75% and no radicular pain to assist with tolerance for ADL, work, household chore, pet care, and recreation tasks--No.  2.  Improve FOTO score by >/= 11 points to improve return to tolerance for ADL, work, household chore, pet care, and recreation tasks--Not Nicole. 3.  Will rate a +5 on Global Rating of Change and be prepared to DC to HEP--Not Assessed. 4. Increase AROM for trunk flex in standing to reaches finger tips to ankles--Not Reassessed. 5. No/minimal tenderness or muscle hypertonicity to palpation--Not Reassessed. 6. Pt able to utilize principles of good posture/body mechanics and techniques of core bracing/lumbar stabilization for ADLs, chores, and work to prevent re-injury--Progressing. [facility] Billing RECOMMENDATIONS:    Discontinue therapy due to lack of attendance or compliance.   Plan was to continue PT at 2x/week for a total of 6-8 weeks and reassess at 10th visit and/or 30 days post-Initial Evaluation, but patient did not return for follow-up treatment session after her 7th visit (total) and 23 days post-Initial Evalution. Patient was making good progress up until she stopped scheduling/attending visits. If you have any questions/comments please contact us directly at [phone]. Thank you for allowing us to assist in the care of your patient.     Therapist Signature: Tanja Cruz, PT Date: 01/07/2022   Reporting Period:   10/04/2021 to 10/27/2021 Time: 1:31 PM